# Patient Record
Sex: FEMALE | Race: WHITE
[De-identification: names, ages, dates, MRNs, and addresses within clinical notes are randomized per-mention and may not be internally consistent; named-entity substitution may affect disease eponyms.]

---

## 2018-07-08 ENCOUNTER — HOSPITAL ENCOUNTER (EMERGENCY)
Dept: HOSPITAL 47 - EC | Age: 28
Discharge: HOME | End: 2018-07-08
Payer: COMMERCIAL

## 2018-07-08 VITALS
RESPIRATION RATE: 18 BRPM | DIASTOLIC BLOOD PRESSURE: 77 MMHG | TEMPERATURE: 98 F | SYSTOLIC BLOOD PRESSURE: 135 MMHG | HEART RATE: 68 BPM

## 2018-07-08 DIAGNOSIS — S92.512A: Primary | ICD-10-CM

## 2018-07-08 DIAGNOSIS — F17.200: ICD-10-CM
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
## 2018-07-08 PROCEDURE — 99283 EMERGENCY DEPT VISIT LOW MDM: CPT

## 2018-07-08 NOTE — XR
EXAMINATION TYPE: XR foot complete LT , 3 VIEWS

 

DATE OF EXAM ORDERED: 7/8/2018

 

HISTORY: Pain.

 

COMPARISON: None.

 

FINDINGS:  There is an oblique lucency projecting over the diaphysis of the proximal phalanx of the l
eft fifth digit. No cortical break is identified. No definite fracture is seen.

 

IMPRESSION: 

 

LUCENCY PROJECTING OVER THE DIAPHYSIS OF THE PROXIMAL PHALANX OF THE LEFT FIFTH DIGIT. PLEASE CORRELA
TE CLINICALLY TO EXCLUDE FRACTURE.

## 2018-07-08 NOTE — ED
Lower Extremity Injury HPI





- General


Chief Complaint: Extremity Injury, Lower


Stated Complaint: LEFT FOOT/TOE INJURY


Time Seen by Provider: 07/08/18 09:50


Source: patient, RN notes reviewed


Mode of arrival: ambulatory


Limitations: no limitations





- History of Present Illness


Initial Comments: 





28-year-old female presents emergency Department chief complaint of left foot 

fifth digit injury.  Patient states she stubbed yesterday states that she woke 

up with worsening pain and bruising.  She states is no pain proximal to the 

toe.  Patient denies any paresthesias denies any other injuries.





- Related Data


 Allergies











Allergy/AdvReac Type Severity Reaction Status Date / Time


 


No Known Allergies Allergy   Verified 07/08/18 09:49














Review of Systems


ROS Statement: 


Those systems with pertinent positive or pertinent negative responses have been 

documented in the HPI.





ROS Other: All systems not noted in ROS Statement are negative.





Past Medical History


Additional Past Medical History / Comment(s): WWP


History of Any Multi-Drug Resistant Organisms: None Reported


Additional Past Surgical History / Comment(s): ORIF left wrist


Past Psychological History: No Psychological Hx Reported


Smoking Status: Current every day smoker


Past Alcohol Use History: Occasional


Past Drug Use History: None Reported





General Exam


Limitations: no limitations


General appearance: alert, in no apparent distress


Respiratory exam: Present: normal lung sounds bilaterally.  Absent: respiratory 

distress, wheezes, rales, rhonchi, stridor


Cardiovascular Exam: Present: regular rate, normal rhythm, normal heart sounds.

  Absent: systolic murmur, diastolic murmur, rubs, gallop, clicks


Extremities exam: Present: other (Left foot fifth digit there is some 

ecchymosis noted, cap refill less than 2 seconds there is no tenderness of the 

MTP joint or proximal to the MTP.)





Course





 Vital Signs











  07/08/18





  09:46


 


Temperature 98 F


 


Pulse Rate 68


 


Respiratory 18





Rate 


 


Blood Pressure 135/77


 


O2 Sat by Pulse 99





Oximetry 














Medical Decision Making





- Medical Decision Making





28-year-old presented for left foot injury.  She has a fracture of her fifth 

digit.  Patient will be advised rest ice and elevate and take anti-

inflammatories as directed.





Disposition


Clinical Impression: 


 Toe fracture, left





Disposition: HOME SELF-CARE


Condition: Stable


Instructions:  Toe Fracture (ED)


Additional Instructions: 


Please return to the Emergency Department if symptoms worsen or any other 

concerns.


Is patient prescribed a controlled substance at d/c from ED?: No


Referrals: 


Prakash Osei MD [Primary Care Provider] - 1-2 days


Time of Disposition: 10:09

## 2025-07-19 ENCOUNTER — HOSPITAL ENCOUNTER (EMERGENCY)
Dept: HOSPITAL 47 - EC | Age: 35
Discharge: HOME | End: 2025-07-19
Payer: COMMERCIAL

## 2025-07-19 VITALS
DIASTOLIC BLOOD PRESSURE: 79 MMHG | TEMPERATURE: 97.5 F | RESPIRATION RATE: 17 BRPM | HEART RATE: 58 BPM | SYSTOLIC BLOOD PRESSURE: 137 MMHG

## 2025-07-19 DIAGNOSIS — R42: Primary | ICD-10-CM

## 2025-07-19 DIAGNOSIS — R11.2: ICD-10-CM

## 2025-07-19 LAB
ALBUMIN SERPL-MCNC: 4.6 G/DL (ref 3.5–5)
ALP SERPL-CCNC: 43 U/L (ref 38–126)
ALT SERPL-CCNC: 44 U/L (ref 4–34)
AMORPH SED URNS QL MICRO: (no result) /HPF
ANION GAP SERPL CALC-SCNC: 13 MMOL/L
ANISOCYTOSIS BLD QL SMEAR: (no result)
APPEARANCE UR: CLEAR
AST SERPL-CCNC: 31 U/L (ref 14–36)
BACTERIA UR QL AUTO: (no result) /HPF
BASO STIPL BLD QL SMEAR: (no result)
BASOPHILS # BLD AUTO: 0.04 10*3/UL (ref 0–0.1)
BASOPHILS NFR BLD AUTO: 0.5 %
BILIRUB BLD-MCNC: 0.6 MG/DL (ref 0.2–1.3)
BILIRUB UR QL CFM: (no result)
BILIRUB UR QL STRIP.AUTO: NEGATIVE
BROAD CASTS [PRESENCE] IN URINE BY COMPUTER ASSISTED METHOD: (no result) /LPF
BUN SERPL-SCNC: 18 MG/DL (ref 7–17)
BURR CELLS BLD QL SMEAR: (no result)
CA CARBONATE CRY #/AREA URNS HPF: (no result) /HPF
CA PHOS CRY UR QL COMP ASSIST: (no result) /HPF
CALCIUM SPEC-MCNC: 9.6 MG/DL (ref 8.4–10.2)
CAOX CRY UR QL COMP ASSIST: (no result) /HPF
CASTS URNS QL MICRO: (no result) /LPF
CHLORIDE SERPL-SCNC: 105 MMOL/L (ref 98–107)
CO2 SERPL-SCNC: 20 MMOL/L (ref 22–30)
COLOR UR: COLORLESS
CREATININE: 0.94 MG/DL (ref 0.52–1.04)
CRYSTALS UR QL: (no result) /HPF
CYSTINE CRY #/AREA URNS HPF: (no result) /HPF
DACRYOCYTES BLD QL SMEAR: (no result)
DOHLE BOD BLD QL SMEAR: (no result)
EOSINOPHIL # BLD AUTO: 0.05 10*3/UL (ref 0.04–0.35)
EOSINOPHIL NFR BLD AUTO: 0.6 %
EPITHELIAL CASTS [PRESENCE] IN URINE BY COMPUTER ASSISTED METHOD: (no result) /LPF
ERYTHROCYTE CLUMPS [PRESENCE] IN URINE BY COMPUTER ASSISTED METHOD: (no result) /HPF
ERYTHROCYTE [DISTWIDTH] IN BLOOD BY AUTOMATED COUNT: 4.42 10*6/UL (ref 4.1–5.2)
ERYTHROCYTE [DISTWIDTH] IN BLOOD: 11.9 % (ref 11.5–14.5)
FATTY CASTS #/AREA UR COMP ASSIST: (no result) /LPF
GLUCOSE SERPL-MCNC: 97 MG/DL (ref 74–99)
GLUCOSE UR QL: NEGATIVE
GRAN CASTS UR QL COMP ASSIST: (no result) /LPF
HCT VFR BLD AUTO: 40.3 % (ref 37.2–46.3)
HGB BLD-MCNC: 14.3 G/DL (ref 12–15)
HOWELL-JOLLY BOD BLD QL SMEAR: (no result)
HYALINE CASTS UR QL AUTO: (no result) /LPF (ref 0–2)
HYPOCHROMIA BLD QL SMEAR: (no result)
IMM GRANULOCYTES # BLD: 0.01 10*3/UL (ref 0–0.04)
KETONES UR QL STRIP.AUTO: NEGATIVE
LEUCINE CRYSTALS [PRESENCE] IN URINE BY COMPUTER ASSISTED METHOD: (no result) /HPF
LEUKOCYTE ESTERASE UR QL STRIP.AUTO: NEGATIVE
LG PLATELETS BLD QL SMEAR: (no result)
LYMPHOCYTES # SPEC AUTO: 2.23 10*3/UL (ref 0.9–5)
LYMPHOCYTES NFR SPEC AUTO: 27.4 %
Lab: (no result)
MAGNESIUM SPEC-SCNC: 2 MG/DL (ref 1.6–2.3)
MCH RBC QN AUTO: 32.4 PG (ref 27–32)
MCHC RBC AUTO-ENTMCNC: 35.5 G/DL (ref 32–37)
MCV RBC AUTO: 91.2 FL (ref 80–97)
MIXED CELL CASTS UR QL COMP ASSIST: (no result) /LPF
MONOCYTES # BLD AUTO: 0.55 10*3/UL (ref 0.2–1)
MONOCYTES NFR BLD AUTO: 6.8 %
MUCOUS THREADS UR QL AUTO: (no result) /HPF
NEUTROPHILS # BLD AUTO: 5.25 10*3/UL (ref 1.8–7.7)
NEUTROPHILS NFR BLD AUTO: 64.6 %
NEUTS HYPERSEG # BLD: (no result) 10*3/UL
NITRITE UR QL STRIP.AUTO: NEGATIVE
NRBC BLD AUTO-RTO: (no result) %
OVAL FAT BODIES #/AREA UR COMP ASSIST: (no result) /HPF
OVALOCYTES BLD QL SMEAR: (no result)
PELGER HUET CELLS BLD QL SMEAR: (no result)
PH UR: 6 [PH] (ref 5–8)
PLATELET # BLD AUTO: 294 10*3/UL (ref 140–440)
PLATELETS.RETICULATED NFR BLD AUTO: (no result) %
PMV BLD AUTO: 9.6 FL (ref 9.5–12.2)
POIKILOCYTOSIS BLD QL SMEAR: (no result)
POIKILOCYTOSIS BLD QL SMEAR: (no result)
POLYCHROMASIA BLD QL SMEAR: (no result)
POTASSIUM SERPL-SCNC: 4.3 MMOL/L (ref 3.5–5.1)
PROT SERPL-MCNC: 7.8 G/DL (ref 6.3–8.2)
PROT UR QL SSA: (no result)
PROT UR QL: NEGATIVE
RBC CASTS UR QL COMP ASSIST: (no result) /LPF
RBC MORPH BLD: (no result)
RBC UR QL: (no result) /HPF (ref 0–5)
RBC UR QL: NEGATIVE
RENAL EPI CELLS UR QL COMP ASSIST: (no result) /HPF
ROULEAUX BLD QL SMEAR: (no result)
SCHISTOCYTES BLD QL SMEAR: (no result)
SICKLE CELLS BLD QL SMEAR: (no result)
SODIUM SERPL-SCNC: 138 MMOL/L (ref 137–145)
SP GR UR: 1.02 (ref 1–1.03)
SPERM # UR AUTO: (no result) /HPF
SPHEROCYTES BLD QL SMEAR: (no result)
SQUAMOUS UR QL AUTO: (no result) /HPF (ref 0–4)
STOMATOCYTES BLD QL SMEAR: (no result)
TARGETS BLD QL SMEAR: (no result)
TOXIC GRANULES BLD QL SMEAR: (no result)
TRANS CELLS UR QL COMP ASSIST: (no result) /HPF (ref 0–1)
TRI-PHOS CRY UR QL COMP ASSIST: (no result) /HPF
TRICHOMONAS UR QL COMP ASSIST: (no result) /HPF
TYROSINE CRYSTALS [PRESENCE] IN URINE BY COMPUTER ASSISTED METHOD: (no result) /HPF
URATE CRY UR QL COMP ASSIST: (no result) /HPF
UROBILINOGEN UR QL STRIP: <2 MG/DL (ref ?–2)
VARIANT LYMPHS BLD QL SMEAR: (no result)
WAXY CASTS #/AREA UR COMP ASSIST: (no result) /LPF
WBC # BLD AUTO: 8.13 10*3/UL (ref 4.5–10)
WBC #/AREA URNS HPF: (no result) /HPF (ref 0–5)
WBC CASTS #/AREA URNS LPF: (no result) /LPF
WBC CLUMPS UR QL AUTO: (no result) /HPF
WBC NRBC COR # BLD: (no result) K/UL
WBC TOXIC VACUOLES BLD QL SMEAR: (no result)
YEAST BUDDING UR QL COMP ASSIST: (no result) /HPF
YEAST HYPHAE UR QL COMP ASSIST: (no result) /HPF

## 2025-07-19 PROCEDURE — 93005 ELECTROCARDIOGRAM TRACING: CPT

## 2025-07-19 PROCEDURE — 96374 THER/PROPH/DIAG INJ IV PUSH: CPT

## 2025-07-19 PROCEDURE — 83735 ASSAY OF MAGNESIUM: CPT

## 2025-07-19 PROCEDURE — 36415 COLL VENOUS BLD VENIPUNCTURE: CPT

## 2025-07-19 PROCEDURE — 96361 HYDRATE IV INFUSION ADD-ON: CPT

## 2025-07-19 PROCEDURE — 81003 URINALYSIS AUTO W/O SCOPE: CPT

## 2025-07-19 PROCEDURE — 80053 COMPREHEN METABOLIC PANEL: CPT

## 2025-07-19 PROCEDURE — 99284 EMERGENCY DEPT VISIT MOD MDM: CPT

## 2025-07-19 PROCEDURE — 85025 COMPLETE CBC W/AUTO DIFF WBC: CPT

## 2025-07-19 PROCEDURE — 81025 URINE PREGNANCY TEST: CPT

## 2025-07-19 PROCEDURE — 96375 TX/PRO/DX INJ NEW DRUG ADDON: CPT

## 2025-07-19 RX ADMIN — ACETAMINOPHEN STA MLS/HR: 10 INJECTION, SOLUTION INTRAVENOUS at 14:17

## 2025-07-19 RX ADMIN — ONDANSETRON STA MG: 2 INJECTION INTRAMUSCULAR; INTRAVENOUS at 14:16

## 2025-07-19 RX ADMIN — CEFAZOLIN STA MLS/HR: 330 INJECTION, POWDER, FOR SOLUTION INTRAMUSCULAR; INTRAVENOUS at 14:16
